# Patient Record
Sex: FEMALE
[De-identification: names, ages, dates, MRNs, and addresses within clinical notes are randomized per-mention and may not be internally consistent; named-entity substitution may affect disease eponyms.]

---

## 2017-11-15 ENCOUNTER — RESULT REVIEW (OUTPATIENT)
Age: 47
End: 2017-11-15

## 2022-03-18 ENCOUNTER — APPOINTMENT (OUTPATIENT)
Dept: OBGYN | Facility: CLINIC | Age: 52
End: 2022-03-18
Payer: COMMERCIAL

## 2022-03-18 ENCOUNTER — TRANSCRIPTION ENCOUNTER (OUTPATIENT)
Age: 52
End: 2022-03-18

## 2022-03-18 VITALS
WEIGHT: 177 LBS | DIASTOLIC BLOOD PRESSURE: 88 MMHG | BODY MASS INDEX: 29.49 KG/M2 | SYSTOLIC BLOOD PRESSURE: 120 MMHG | HEIGHT: 65 IN

## 2022-03-18 DIAGNOSIS — Z87.42 PERSONAL HISTORY OF OTHER DISEASES OF THE FEMALE GENITAL TRACT: ICD-10-CM

## 2022-03-18 DIAGNOSIS — Z78.9 OTHER SPECIFIED HEALTH STATUS: ICD-10-CM

## 2022-03-18 DIAGNOSIS — Z01.419 ENCOUNTER FOR GYNECOLOGICAL EXAMINATION (GENERAL) (ROUTINE) W/OUT ABNORMAL FINDINGS: ICD-10-CM

## 2022-03-18 DIAGNOSIS — N95.0 POSTMENOPAUSAL BLEEDING: ICD-10-CM

## 2022-03-18 PROBLEM — Z00.00 ENCOUNTER FOR PREVENTIVE HEALTH EXAMINATION: Status: ACTIVE | Noted: 2022-03-18

## 2022-03-18 PROCEDURE — 82270 OCCULT BLOOD FECES: CPT

## 2022-03-18 PROCEDURE — 99386 PREV VISIT NEW AGE 40-64: CPT

## 2022-03-18 PROCEDURE — 99212 OFFICE O/P EST SF 10 MIN: CPT | Mod: 25

## 2022-03-18 NOTE — COUNSELING
[Nutrition/ Exercise/ Weight Management] : nutrition, exercise, weight management [Vitamins/Supplements] : vitamins/supplements [Breast Self Exam] : breast self exam [Confidentiality] : confidentiality [Vaccines] : vaccines

## 2022-03-22 LAB — HPV HIGH+LOW RISK DNA PNL CVX: NOT DETECTED

## 2022-03-28 LAB — CYTOLOGY CVX/VAG DOC THIN PREP: NORMAL

## 2022-04-01 ENCOUNTER — NON-APPOINTMENT (OUTPATIENT)
Age: 52
End: 2022-04-01

## 2022-04-01 DIAGNOSIS — Z78.0 ASYMPTOMATIC MENOPAUSAL STATE: ICD-10-CM

## 2022-04-06 ENCOUNTER — APPOINTMENT (OUTPATIENT)
Dept: OBGYN | Facility: CLINIC | Age: 52
End: 2022-04-06

## 2022-04-14 ENCOUNTER — APPOINTMENT (OUTPATIENT)
Dept: RADIOLOGY | Facility: CLINIC | Age: 52
End: 2022-04-14
Payer: COMMERCIAL

## 2022-04-14 ENCOUNTER — APPOINTMENT (OUTPATIENT)
Dept: ULTRASOUND IMAGING | Facility: CLINIC | Age: 52
End: 2022-04-14
Payer: COMMERCIAL

## 2022-04-14 PROCEDURE — 77080 DXA BONE DENSITY AXIAL: CPT

## 2022-04-14 PROCEDURE — 76830 TRANSVAGINAL US NON-OB: CPT

## 2022-04-14 PROCEDURE — 76856 US EXAM PELVIC COMPLETE: CPT

## 2022-04-15 NOTE — PHYSICAL EXAM
[Chaperone Present] : A chaperone was present in the examining room during all aspects of the physical examination [Appropriately responsive] : appropriately responsive [Alert] : alert [No Acute Distress] : no acute distress [No Lymphadenopathy] : no lymphadenopathy [Regular Rate Rhythm] : regular rate rhythm [No Murmurs] : no murmurs [Clear to Auscultation B/L] : clear to auscultation bilaterally [Soft] : soft [Non-tender] : non-tender [Non-distended] : non-distended [No HSM] : No HSM [No Lesions] : no lesions [No Mass] : no mass [Oriented x3] : oriented x3 [Examination Of The Breasts] : a normal appearance [No Masses] : no breast masses were palpable [Labia Majora] : normal [Labia Minora] : normal [Normal] : normal [Uterine Adnexae] : normal [FreeTextEntry2] : right and left labia majora benign sebaceous cysts [FreeTextEntry5] : cervix looks like whe had a procedure on it; she explains she had cryo proceedure during fertility treatment.  [FreeTextEntry9] : no masses. Guaiac negative.

## 2022-04-15 NOTE — HISTORY OF PRESENT ILLNESS
[No] : Patient does not have concerns regarding sex [Currently Active] : currently active [Men] : men [TextBox_19] : Rx given  [ColonoscopyDate] : 2017 [TextBox_43] : benign polyp again  [FreeTextEntry1] : vaginal dryness and uses vaginal moisturizer

## 2022-04-15 NOTE — PLAN
return to ED if symptoms worsen, persist or questions arise/radiology results/need for outpatient follow-up/lab results
[FreeTextEntry1] : 51 year old female pt presents for Routine Gyn Exam:\par BSA, calcium, vitamin D and exercise d/w pt\par Pap smear conducted w/ HPV\par Rx given for mammogram and breast sonogram\par B/L axillary sono for breast tissue in b/l axilla\par Advised pt to schedule colonoscopy\par DEXA d/w patient and advised to schedule \par Cancer genetic testing  discussed with pt \par PMB:\par Advised to schedule pelvic sono \par To schedule sonohysterogram\par Sono guided  endo biopsy due to postmenopausal bleeding \par Advised pt to see PCP and dermatologist annually\par RTO in 1 year or PRN\par

## 2022-04-20 ENCOUNTER — APPOINTMENT (OUTPATIENT)
Dept: OBGYN | Facility: CLINIC | Age: 52
End: 2022-04-20
Payer: COMMERCIAL

## 2022-04-20 ENCOUNTER — ASOB RESULT (OUTPATIENT)
Age: 52
End: 2022-04-20

## 2022-04-20 PROCEDURE — 76831 ECHO EXAM UTERUS: CPT

## 2022-04-20 PROCEDURE — 58340 CATHETER FOR HYSTEROGRAPHY: CPT

## 2022-04-20 NOTE — PLAN
[FreeTextEntry1] : Advised to call me if fever, severe pain or heavy VB\par No Polyps \par She will RTO for endo bx

## 2022-04-20 NOTE — PROCEDURE
[Saline Infusion Sonography] : saline infusion sonography [FreeTextEntry9] : PMB [FreeTextEntry3] :  04/20/2022. JASE PEREZ 51 year year old female presents for a sonohysterogram due to ???\par Discussed risk of bleeding or infection\par Under sterile technique\par A speculum was placed in the vagina and the Cervix was identified and prepped with Betadyne\par SHG catheter was threaded through the external os until endometrial location was felt\par The balloon was then inflated with .5 cc of saline\par The transvaginal probe was then inserted into the vagina by the tech\par The catheter and balloon was localized\par Saline was gently instilled while the imaging was performed\par The endometrial cavity was fully visualized\par Findings: no polyps\par Pt tolerated procedure well and was given instructions prior to discharge\par

## 2022-04-27 ENCOUNTER — APPOINTMENT (OUTPATIENT)
Dept: OBGYN | Facility: CLINIC | Age: 52
End: 2022-04-27
Payer: COMMERCIAL

## 2022-04-27 ENCOUNTER — ASOB RESULT (OUTPATIENT)
Age: 52
End: 2022-04-27

## 2022-04-27 VITALS — DIASTOLIC BLOOD PRESSURE: 70 MMHG | SYSTOLIC BLOOD PRESSURE: 124 MMHG

## 2022-04-27 DIAGNOSIS — N95.0 POSTMENOPAUSAL BLEEDING: ICD-10-CM

## 2022-04-27 PROCEDURE — 76856 US EXAM PELVIC COMPLETE: CPT

## 2022-04-27 PROCEDURE — 58340 CATHETER FOR HYSTEROGRAPHY: CPT

## 2022-05-02 LAB — CORE LAB BIOPSY: NORMAL

## 2022-05-03 ENCOUNTER — NON-APPOINTMENT (OUTPATIENT)
Age: 52
End: 2022-05-03

## 2024-09-16 ENCOUNTER — NON-APPOINTMENT (OUTPATIENT)
Age: 54
End: 2024-09-16

## 2024-09-29 ENCOUNTER — NON-APPOINTMENT (OUTPATIENT)
Age: 54
End: 2024-09-29

## 2024-09-30 ENCOUNTER — APPOINTMENT (OUTPATIENT)
Dept: OBGYN | Facility: CLINIC | Age: 54
End: 2024-09-30
Payer: COMMERCIAL

## 2024-09-30 VITALS
BODY MASS INDEX: 29.99 KG/M2 | DIASTOLIC BLOOD PRESSURE: 76 MMHG | SYSTOLIC BLOOD PRESSURE: 112 MMHG | WEIGHT: 180 LBS | HEIGHT: 65 IN

## 2024-09-30 DIAGNOSIS — Z01.419 ENCOUNTER FOR GYNECOLOGICAL EXAMINATION (GENERAL) (ROUTINE) W/OUT ABNORMAL FINDINGS: ICD-10-CM

## 2024-09-30 DIAGNOSIS — N95.8 OTHER SPECIFIED MENOPAUSAL AND PERIMENOPAUSAL DISORDERS: ICD-10-CM

## 2024-09-30 PROCEDURE — 99396 PREV VISIT EST AGE 40-64: CPT

## 2024-09-30 PROCEDURE — 99459 PELVIC EXAMINATION: CPT

## 2024-09-30 PROCEDURE — 82270 OCCULT BLOOD FECES: CPT

## 2024-09-30 RX ORDER — ESTRADIOL 10 UG/1
10 TABLET, FILM COATED VAGINAL
Qty: 24 | Refills: 3 | Status: ACTIVE | COMMUNITY
Start: 2024-09-30 | End: 1900-01-01

## 2024-09-30 NOTE — PLAN
[FreeTextEntry1] : Routine Gyn Exam: BSA, calcium, vitamin D and exercise d/w pt Breast and pelvic exam performed Pap/HPV conducted Rx given for mammogram and breast sonogram Advised pt schedule colonoscopy DEXA d/w pt due in 2 years Advised pt to see PCP and dermatology annually  weight gain: advised diet and exercise referred pt to [facility] weight loss MDs  vaginal dryness:  r/b/a d/w pt on vaginal estrogen Rx for Vagifem   RTO in 3 months for Vagifem f/u or PRN

## 2024-09-30 NOTE — PHYSICAL EXAM
[Chaperone Present] : A chaperone was present in the examining room during all aspects of the physical examination [[id number]] : A chaperone was present during the pelvic exam. [Appropriately responsive] : appropriately responsive [Alert] : alert [No Acute Distress] : no acute distress [No Lymphadenopathy] : no lymphadenopathy [Regular Rate Rhythm] : regular rate rhythm [No Murmurs] : no murmurs [Clear to Auscultation B/L] : clear to auscultation bilaterally [Soft] : soft [Non-tender] : non-tender [Non-distended] : non-distended [No HSM] : No HSM [No Lesions] : no lesions [No Mass] : no mass [Oriented x3] : oriented x3 [Examination Of The Breasts] : a normal appearance [No Masses] : no breast masses were palpable [Vulvar Atrophy] : vulvar atrophy [Labia Majora] : normal [Labia Minora] : normal [Atrophy] : atrophy [Normal] : normal [Uterine Adnexae] : normal [FreeTextEntry2] : Farida Brown  [FreeTextEntry9] : guaiac negative, no masses noted

## 2024-09-30 NOTE — HISTORY OF PRESENT ILLNESS
[Patient reported PAP Smear was normal] : Patient reported PAP Smear was normal [Patient reported bone density results were normal] : Patient reported bone density results were normal [Currently Active] : currently active [Men] : men [FreeTextEntry1] : 2024. JASE PEREZ 52 year old female  LMP 2019, presents for GYN exam.   She denies VB. She denies abdominal and pelvic pain, no vaginal discharge or vaginitis symptoms. She reports normal urination, no dysuria, no incontinence of urine. BM is normal per patient, no blood in stool or constipation/diarrhea.   Pt had 3 IVF with 3x SAB  MedHx: PCOS, borderline HLD,  Meds: multivitamins, Vit D3, black seed oil, cholesterol OTC, collagen and biotin, evening primrose ObHx:  3x SAB GynHx:Hx of PCOS, and ovarian cysts ,hx of abnl paps. No Hx of STI, FIbroids, or pelvic infections. SurgHx: laparoscopy when undergoing fertility treatment. Also cervical cryo when undergoing fertility treatment. FamHx: Mother w/ breast ca in her 70's. Denies FHx of ovarian, uterine or colon cancer All: NKDA Social: social alcohol, no tobacco or drug use PHQ-9=3   of note pt works in finance [TextBox_4] : endo bx 4/2022- atrophic endometrium SHG 2022 - no polyps  pelvic sono 2022- Normal thickness of the endometrial complex, which measures 4 mm, Small uterine myoma, Findings raising a question of adenomyosis. [Mammogramdate] : 7/2024 [PapSmeardate] : 3/2022 [BoneDensityDate] : 4/2022 [TextBox_37] : repeat 2 years  [ColonoscopyDate] : 2022

## 2024-10-02 LAB — HPV HIGH+LOW RISK DNA PNL CVX: NOT DETECTED

## 2024-10-10 LAB — CYTOLOGY CVX/VAG DOC THIN PREP: NORMAL

## 2024-10-21 ENCOUNTER — APPOINTMENT (OUTPATIENT)
Dept: VASCULAR SURGERY | Facility: CLINIC | Age: 54
End: 2024-10-21